# Patient Record
Sex: FEMALE | Race: WHITE | NOT HISPANIC OR LATINO | URBAN - METROPOLITAN AREA
[De-identification: names, ages, dates, MRNs, and addresses within clinical notes are randomized per-mention and may not be internally consistent; named-entity substitution may affect disease eponyms.]

---

## 2019-11-08 ENCOUNTER — HOSPITAL ENCOUNTER (OUTPATIENT)
Dept: OTHER | Facility: HOSPITAL | Age: 49
Discharge: HOME | End: 2019-11-08

## 2019-11-08 LAB
25(OH)D3 SERPL-MCNC: 18.4 NG/ML (ref 30–100)
ALBUMIN SERPL-MCNC: 4.3 G/DL (ref 3.5–5)
ALBUMIN/GLOB SERPL: 1.4 {RATIO} (ref 1.4–2.6)
ALP SERPL-CCNC: 51 U/L (ref 42–98)
ALT SERPL-CCNC: 9 U/L (ref 10–40)
ANION GAP SERPL CALC-SCNC: 16 MMOL/L (ref 8–19)
APPEARANCE UR: CLEAR
AST SERPL-CCNC: 13 U/L (ref 15–50)
BILIRUB SERPL-MCNC: 0.2 MG/DL (ref 0.2–1.3)
BILIRUB UR QL: NEGATIVE
BUN SERPL-MCNC: 9 MG/DL (ref 5–25)
BUN/CREAT SERPL: 10 {RATIO} (ref 6–20)
CALCIUM SERPL-MCNC: 9.6 MG/DL (ref 8.7–10.4)
CHLORIDE SERPL-SCNC: 103 MMOL/L (ref 99–111)
CHOLEST SERPL-MCNC: 214 MG/DL (ref 107–200)
CHOLEST/HDLC SERPL: 4.1 {RATIO} (ref 3–6)
COLOR UR: YELLOW
CONV CO2: 23 MMOL/L (ref 22–32)
CONV COLLECTION SOURCE (UA): NORMAL
CONV TOTAL PROTEIN: 7.3 G/DL (ref 6.3–8.2)
CONV UROBILINOGEN IN URINE BY AUTOMATED TEST STRIP: 0.2 {EHRLICHU}/DL (ref 0.1–1)
CREAT UR-MCNC: 0.88 MG/DL (ref 0.5–0.9)
GFR SERPLBLD BASED ON 1.73 SQ M-ARVRAT: >60 ML/MIN/{1.73_M2}
GLOBULIN UR ELPH-MCNC: 3 G/DL (ref 2–3.5)
GLUCOSE SERPL-MCNC: 125 MG/DL (ref 65–99)
GLUCOSE UR QL: NEGATIVE MG/DL
HDLC SERPL-MCNC: 52 MG/DL (ref 40–60)
HGB UR QL STRIP: NEGATIVE
KETONES UR QL STRIP: NEGATIVE MG/DL
LDLC SERPL CALC-MCNC: 134 MG/DL (ref 70–100)
LEUKOCYTE ESTERASE UR QL STRIP: NEGATIVE
NITRITE UR QL STRIP: NEGATIVE
OSMOLALITY SERPL CALC.SUM OF ELEC: 286 MOSM/KG (ref 273–304)
PH UR STRIP.AUTO: 6.5 [PH] (ref 5–8)
POTASSIUM SERPL-SCNC: 3.6 MMOL/L (ref 3.5–5.3)
PROT UR QL: NEGATIVE MG/DL
SODIUM SERPL-SCNC: 138 MMOL/L (ref 135–147)
SP GR UR: 1.01 (ref 1–1.03)
TRIGL SERPL-MCNC: 140 MG/DL (ref 40–150)
VLDLC SERPL-MCNC: 28 MG/DL (ref 5–37)

## 2019-11-10 LAB
HIV1 RNA # SERPL NAA+PROBE: <20 COPIES/ML
HIV1 RNA # SERPL NAA+PROBE: NORMAL LOG10COPY/ML

## 2019-11-18 ENCOUNTER — HOSPITAL ENCOUNTER (OUTPATIENT)
Dept: OTHER | Facility: HOSPITAL | Age: 49
Discharge: HOME | End: 2019-11-18

## 2019-11-19 LAB — Lab: NORMAL

## 2020-06-01 ENCOUNTER — HOSPITAL ENCOUNTER (OUTPATIENT)
Dept: URGENT CARE | Facility: CLINIC | Age: 50
Discharge: HOME | End: 2020-06-01
Attending: FAMILY MEDICINE

## 2020-06-12 ENCOUNTER — HOSPITAL ENCOUNTER (OUTPATIENT)
Dept: OTHER | Facility: HOSPITAL | Age: 50
Discharge: HOME | End: 2020-06-12

## 2020-06-12 LAB
25(OH)D3 SERPL-MCNC: 15.4 NG/ML (ref 30–100)
ALBUMIN SERPL-MCNC: 4.4 G/DL (ref 3.5–5)
ALBUMIN/GLOB SERPL: 1.5 {RATIO} (ref 1.4–2.6)
ALP SERPL-CCNC: 46 U/L (ref 42–98)
ALT SERPL-CCNC: 17 U/L (ref 10–40)
ANION GAP SERPL CALC-SCNC: 13 MMOL/L (ref 8–19)
APPEARANCE UR: CLEAR
AST SERPL-CCNC: 17 U/L (ref 15–50)
BILIRUB SERPL-MCNC: <0.15 MG/DL (ref 0.2–1.3)
BILIRUB UR QL: NEGATIVE
BUN SERPL-MCNC: 9 MG/DL (ref 5–25)
BUN/CREAT SERPL: 9 {RATIO} (ref 6–20)
CALCIUM SERPL-MCNC: 9.7 MG/DL (ref 8.7–10.4)
CHLORIDE SERPL-SCNC: 105 MMOL/L (ref 99–111)
CHOLEST SERPL-MCNC: 233 MG/DL (ref 107–200)
CHOLEST/HDLC SERPL: 3.8 {RATIO} (ref 3–6)
COLOR UR: YELLOW
CONV BACTERIA: ABNORMAL
CONV CO2: 24 MMOL/L (ref 22–32)
CONV COLLECTION SOURCE (UA): ABNORMAL
CONV TOTAL PROTEIN: 7.4 G/DL (ref 6.3–8.2)
CONV UROBILINOGEN IN URINE BY AUTOMATED TEST STRIP: 0.2 {EHRLICHU}/DL (ref 0.1–1)
CREAT UR-MCNC: 1.03 MG/DL (ref 0.5–0.9)
GFR SERPLBLD BASED ON 1.73 SQ M-ARVRAT: >60 ML/MIN/{1.73_M2}
GLOBULIN UR ELPH-MCNC: 3 G/DL (ref 2–3.5)
GLUCOSE SERPL-MCNC: 86 MG/DL (ref 65–99)
GLUCOSE UR QL: NEGATIVE MG/DL
HDLC SERPL-MCNC: 61 MG/DL (ref 40–60)
HGB UR QL STRIP: NEGATIVE
KETONES UR QL STRIP: NEGATIVE MG/DL
LDLC SERPL CALC-MCNC: 142 MG/DL (ref 70–100)
LEUKOCYTE ESTERASE UR QL STRIP: ABNORMAL
NITRITE UR QL STRIP: NEGATIVE
OSMOLALITY SERPL CALC.SUM OF ELEC: 284 MOSM/KG (ref 273–304)
PH UR STRIP.AUTO: 6 [PH] (ref 5–8)
POTASSIUM SERPL-SCNC: 4 MMOL/L (ref 3.5–5.3)
PROT UR QL: NEGATIVE MG/DL
RBC #/AREA URNS HPF: ABNORMAL /[HPF]
RPR SER QL: NORMAL
SODIUM SERPL-SCNC: 138 MMOL/L (ref 135–147)
SP GR UR: 1.01 (ref 1–1.03)
TRIGL SERPL-MCNC: 151 MG/DL (ref 40–150)
VLDLC SERPL-MCNC: 30 MG/DL (ref 5–37)
WBC #/AREA URNS HPF: ABNORMAL /[HPF]

## 2020-06-13 LAB — Lab: NORMAL

## 2020-06-14 LAB
HIV1 RNA # SERPL NAA+PROBE: <20 COPIES/ML
HIV1 RNA # SERPL NAA+PROBE: NORMAL LOG10COPY/ML

## 2020-06-17 LAB
HIV1 RNA # SERPL NAA+PROBE: 120 COPIES/ML
HIV1 RNA # SERPL NAA+PROBE: 2.08 LOG10COPY/ML
Lab: NORMAL

## 2020-07-14 ENCOUNTER — HOSPITAL ENCOUNTER (OUTPATIENT)
Dept: URGENT CARE | Facility: CLINIC | Age: 50
Discharge: HOME | End: 2020-07-14
Attending: NURSE PRACTITIONER

## 2020-07-18 ENCOUNTER — HOSPITAL ENCOUNTER (OUTPATIENT)
Dept: URGENT CARE | Facility: CLINIC | Age: 50
Discharge: HOME | End: 2020-07-18

## 2021-03-21 ENCOUNTER — HOSPITAL ENCOUNTER (OUTPATIENT)
Dept: URGENT CARE | Facility: CLINIC | Age: 51
Discharge: HOME | End: 2021-03-21
Attending: NURSE PRACTITIONER

## 2021-04-02 ENCOUNTER — HOSPITAL ENCOUNTER (OUTPATIENT)
Dept: OTHER | Facility: HOSPITAL | Age: 51
Discharge: HOME | End: 2021-04-02

## 2021-04-02 LAB
ALBUMIN SERPL-MCNC: 4.1 G/DL (ref 3.5–5)
ALBUMIN/GLOB SERPL: 1.3 {RATIO} (ref 1.4–2.6)
ALP SERPL-CCNC: 41 U/L (ref 42–98)
ALT SERPL-CCNC: 10 U/L (ref 10–40)
ANION GAP SERPL CALC-SCNC: 15 MMOL/L (ref 8–19)
AST SERPL-CCNC: 14 U/L (ref 15–50)
BASOPHILS # BLD AUTO: 0.02 10*3/UL (ref 0–0.2)
BASOPHILS NFR BLD AUTO: 0.2 % (ref 0–3)
BILIRUB SERPL-MCNC: <0.15 MG/DL (ref 0.2–1.3)
BUN SERPL-MCNC: 16 MG/DL (ref 5–25)
BUN/CREAT SERPL: 14 {RATIO} (ref 6–20)
CALCIUM SERPL-MCNC: 10.1 MG/DL (ref 8.7–10.4)
CHLORIDE SERPL-SCNC: 105 MMOL/L (ref 99–111)
CK SERPL-CCNC: 48 U/L (ref 35–230)
CONV ABS IMM GRAN: 0.04 10*3/UL (ref 0–0.2)
CONV CO2: 23 MMOL/L (ref 22–32)
CONV IMMATURE GRAN: 0.4 % (ref 0–1.8)
CONV TOTAL PROTEIN: 7.3 G/DL (ref 6.3–8.2)
CREAT UR-MCNC: 1.13 MG/DL (ref 0.5–0.9)
DEPRECATED RDW RBC AUTO: 51 FL (ref 36.4–46.3)
EOSINOPHIL # BLD AUTO: 0.08 10*3/UL (ref 0–0.7)
EOSINOPHIL # BLD AUTO: 0.9 % (ref 0–7)
ERYTHROCYTE [DISTWIDTH] IN BLOOD BY AUTOMATED COUNT: 13.9 % (ref 11.7–14.4)
GFR SERPLBLD BASED ON 1.73 SQ M-ARVRAT: 57 ML/MIN/{1.73_M2}
GLOBULIN UR ELPH-MCNC: 3.2 G/DL (ref 2–3.5)
GLUCOSE SERPL-MCNC: 92 MG/DL (ref 65–99)
HCT VFR BLD AUTO: 41.9 % (ref 37–47)
HGB BLD-MCNC: 13.7 G/DL (ref 12–16)
LYMPHOCYTES # BLD AUTO: 1.9 10*3/UL (ref 1–5)
LYMPHOCYTES NFR BLD AUTO: 20.3 % (ref 20–45)
MCH RBC QN AUTO: 32.5 PG (ref 27–31)
MCHC RBC AUTO-ENTMCNC: 32.7 G/DL (ref 33–37)
MCV RBC AUTO: 99.5 FL (ref 81–99)
MONOCYTES # BLD AUTO: 0.59 10*3/UL (ref 0.2–1.2)
MONOCYTES NFR BLD AUTO: 6.3 % (ref 3–10)
NEUTROPHILS # BLD AUTO: 6.73 10*3/UL (ref 2–8)
NEUTROPHILS NFR BLD AUTO: 71.9 % (ref 30–85)
NRBC CBCN: 0 % (ref 0–0.7)
OSMOLALITY SERPL CALC.SUM OF ELEC: 287 MOSM/KG (ref 273–304)
PLATELET # BLD AUTO: 221 10*3/UL (ref 130–400)
PMV BLD AUTO: 10.8 FL (ref 9.4–12.3)
POTASSIUM SERPL-SCNC: 4.9 MMOL/L (ref 3.5–5.3)
RBC # BLD AUTO: 4.21 10*6/UL (ref 4.2–5.4)
SODIUM SERPL-SCNC: 138 MMOL/L (ref 135–147)
WBC # BLD AUTO: 9.36 10*3/UL (ref 4.8–10.8)

## 2021-04-03 LAB
HIV1 RNA # SERPL NAA+PROBE: 1.48 LOG10COPY/ML
HIV1 RNA # SERPL NAA+PROBE: 30 COPIES/ML

## 2021-04-04 LAB
CD3 CELLS # BLD: 1675 CELLS/UL (ref 570–2400)
CD3+CD4+ CELLS/CD3+CD8+ CLL BLD: 0.23 RATIO (ref 0.8–3.9)
CONV LYMPHOCYTE SUBSET PANEL 3 INFO.: NORMAL
CONV TREPONEMA PALLIDUM (RPR) WITH FTA-ABS, TP-PA REFLEXES: NON REACTIVE
DEPRECATED CD4 CELLS # BLD: 294 CELLS/UL (ref 430–1800)
DEPRECATED CD8 CELLS # BLD: 1290 CELLS/UL (ref 210–1200)

## 2021-07-06 PROCEDURE — 87086 URINE CULTURE/COLONY COUNT: CPT | Performed by: NURSE PRACTITIONER

## 2021-07-07 ENCOUNTER — TELEPHONE (OUTPATIENT)
Dept: URGENT CARE | Facility: CLINIC | Age: 51
End: 2021-07-07

## 2021-07-07 NOTE — TELEPHONE ENCOUNTER
----- Message from EVI Campos sent at 7/7/2021 10:35 AM EDT -----  Please notify patient of negative urine culture, recommend follow up with PCP, to ED if worsens or as needed.

## 2021-12-01 ENCOUNTER — APPOINTMENT (OUTPATIENT)
Dept: CT IMAGING | Facility: HOSPITAL | Age: 51
End: 2021-12-01

## 2021-12-01 ENCOUNTER — HOSPITAL ENCOUNTER (EMERGENCY)
Facility: HOSPITAL | Age: 51
Discharge: HOME | End: 2021-12-01
Attending: EMERGENCY MEDICINE | Admitting: EMERGENCY MEDICINE

## 2021-12-01 VITALS
SYSTOLIC BLOOD PRESSURE: 130 MMHG | OXYGEN SATURATION: 100 % | HEIGHT: 62 IN | HEART RATE: 92 BPM | BODY MASS INDEX: 25.15 KG/M2 | RESPIRATION RATE: 16 BRPM | WEIGHT: 136.69 LBS | DIASTOLIC BLOOD PRESSURE: 72 MMHG | TEMPERATURE: 98.7 F

## 2021-12-01 DIAGNOSIS — R10.9 ABDOMINAL PAIN, UNSPECIFIED ABDOMINAL LOCATION: ICD-10-CM

## 2021-12-01 DIAGNOSIS — R19.7 DIARRHEA, UNSPECIFIED TYPE: Primary | ICD-10-CM

## 2021-12-01 LAB
027 TOXIN: NORMAL
ALBUMIN SERPL-MCNC: 5 G/DL (ref 3.5–5.2)
ALBUMIN/GLOB SERPL: 1.4 G/DL
ALP SERPL-CCNC: 90 U/L (ref 39–117)
ALT SERPL W P-5'-P-CCNC: 22 U/L (ref 1–33)
ANION GAP SERPL CALCULATED.3IONS-SCNC: 17.4 MMOL/L (ref 5–15)
AST SERPL-CCNC: 33 U/L (ref 1–32)
BACTERIA UR QL AUTO: ABNORMAL /HPF
BASOPHILS # BLD AUTO: 0.02 10*3/MM3 (ref 0–0.2)
BASOPHILS NFR BLD AUTO: 0.3 % (ref 0–1.5)
BILIRUB SERPL-MCNC: 0.4 MG/DL (ref 0–1.2)
BILIRUB UR QL STRIP: NEGATIVE
BUN SERPL-MCNC: 8 MG/DL (ref 6–20)
BUN/CREAT SERPL: 7.9 (ref 7–25)
C COLI+JEJ+UPSA DNA STL QL NAA+NON-PROBE: NOT DETECTED
C DIFF TOX GENS STL QL NAA+PROBE: NEGATIVE
CALCIUM SPEC-SCNC: 9.9 MG/DL (ref 8.6–10.5)
CHLORIDE SERPL-SCNC: 94 MMOL/L (ref 98–107)
CLARITY UR: ABNORMAL
CO2 SERPL-SCNC: 24.6 MMOL/L (ref 22–29)
COLOR UR: YELLOW
CREAT SERPL-MCNC: 1.01 MG/DL (ref 0.57–1)
D-LACTATE SERPL-SCNC: 2.9 MMOL/L (ref 0.5–2)
DEPRECATED RDW RBC AUTO: 46.5 FL (ref 37–54)
EC STX1+STX2 GENES STL QL NAA+NON-PROBE: NOT DETECTED
EOSINOPHIL # BLD AUTO: 0.04 10*3/MM3 (ref 0–0.4)
EOSINOPHIL NFR BLD AUTO: 0.6 % (ref 0.3–6.2)
ERYTHROCYTE [DISTWIDTH] IN BLOOD BY AUTOMATED COUNT: 13.2 % (ref 12.3–15.4)
FLUAV AG NPH QL: NEGATIVE
FLUBV AG NPH QL IA: NEGATIVE
GFR SERPL CREATININE-BSD FRML MDRD: 58 ML/MIN/1.73
GLOBULIN UR ELPH-MCNC: 3.6 GM/DL
GLUCOSE SERPL-MCNC: 122 MG/DL (ref 65–99)
GLUCOSE UR STRIP-MCNC: NEGATIVE MG/DL
HCT VFR BLD AUTO: 47.6 % (ref 34–46.6)
HGB BLD-MCNC: 16.8 G/DL (ref 12–15.9)
HGB UR QL STRIP.AUTO: ABNORMAL
HOLD SPECIMEN: NORMAL
HOLD SPECIMEN: NORMAL
HYALINE CASTS UR QL AUTO: ABNORMAL /LPF
IMM GRANULOCYTES # BLD AUTO: 0.02 10*3/MM3 (ref 0–0.05)
IMM GRANULOCYTES NFR BLD AUTO: 0.3 % (ref 0–0.5)
KETONES UR QL STRIP: NEGATIVE
LEUKOCYTE ESTERASE UR QL STRIP.AUTO: NEGATIVE
LIPASE SERPL-CCNC: 33 U/L (ref 13–60)
LYMPHOCYTES # BLD AUTO: 1.49 10*3/MM3 (ref 0.7–3.1)
LYMPHOCYTES NFR BLD AUTO: 21.9 % (ref 19.6–45.3)
MAGNESIUM SERPL-MCNC: 1.6 MG/DL (ref 1.6–2.6)
MCH RBC QN AUTO: 33.5 PG (ref 26.6–33)
MCHC RBC AUTO-ENTMCNC: 35.3 G/DL (ref 31.5–35.7)
MCV RBC AUTO: 95 FL (ref 79–97)
MONOCYTES # BLD AUTO: 0.41 10*3/MM3 (ref 0.1–0.9)
MONOCYTES NFR BLD AUTO: 6 % (ref 5–12)
NEUTROPHILS NFR BLD AUTO: 4.83 10*3/MM3 (ref 1.7–7)
NEUTROPHILS NFR BLD AUTO: 70.9 % (ref 42.7–76)
NITRITE UR QL STRIP: NEGATIVE
NRBC BLD AUTO-RTO: 0 /100 WBC (ref 0–0.2)
PH UR STRIP.AUTO: 5.5 [PH] (ref 5–8)
PLATELET # BLD AUTO: 213 10*3/MM3 (ref 140–450)
PMV BLD AUTO: 9.5 FL (ref 6–12)
POTASSIUM SERPL-SCNC: 3.8 MMOL/L (ref 3.5–5.2)
PROT SERPL-MCNC: 8.6 G/DL (ref 6–8.5)
PROT UR QL STRIP: ABNORMAL
RBC # BLD AUTO: 5.01 10*6/MM3 (ref 3.77–5.28)
RBC # UR STRIP: ABNORMAL /HPF
REF LAB TEST METHOD: ABNORMAL
S ENT+BONG DNA STL QL NAA+NON-PROBE: NOT DETECTED
SARS-COV-2 N GENE RESP QL NAA+PROBE: NOT DETECTED
SHIGELLA SP+EIEC IPAH ST NAA+NON-PROBE: NOT DETECTED
SODIUM SERPL-SCNC: 136 MMOL/L (ref 136–145)
SP GR UR STRIP: 1.01 (ref 1–1.03)
SQUAMOUS #/AREA URNS HPF: ABNORMAL /HPF
UROBILINOGEN UR QL STRIP: ABNORMAL
WBC # UR STRIP: ABNORMAL /HPF
WBC NRBC COR # BLD: 6.81 10*3/MM3 (ref 3.4–10.8)
WHOLE BLOOD HOLD SPECIMEN: NORMAL
WHOLE BLOOD HOLD SPECIMEN: NORMAL

## 2021-12-01 PROCEDURE — 25010000002 DICYCLOMINE PER 20 MG: Performed by: EMERGENCY MEDICINE

## 2021-12-01 PROCEDURE — 81001 URINALYSIS AUTO W/SCOPE: CPT | Performed by: EMERGENCY MEDICINE

## 2021-12-01 PROCEDURE — 96372 THER/PROPH/DIAG INJ SC/IM: CPT

## 2021-12-01 PROCEDURE — 25010000002 ONDANSETRON PER 1 MG: Performed by: EMERGENCY MEDICINE

## 2021-12-01 PROCEDURE — 87635 SARS-COV-2 COVID-19 AMP PRB: CPT | Performed by: EMERGENCY MEDICINE

## 2021-12-01 PROCEDURE — 87493 C DIFF AMPLIFIED PROBE: CPT | Performed by: EMERGENCY MEDICINE

## 2021-12-01 PROCEDURE — 83690 ASSAY OF LIPASE: CPT | Performed by: EMERGENCY MEDICINE

## 2021-12-01 PROCEDURE — 87505 NFCT AGENT DETECTION GI: CPT | Performed by: EMERGENCY MEDICINE

## 2021-12-01 PROCEDURE — 80053 COMPREHEN METABOLIC PANEL: CPT | Performed by: EMERGENCY MEDICINE

## 2021-12-01 PROCEDURE — 96374 THER/PROPH/DIAG INJ IV PUSH: CPT

## 2021-12-01 PROCEDURE — 74177 CT ABD & PELVIS W/CONTRAST: CPT

## 2021-12-01 PROCEDURE — 85025 COMPLETE CBC W/AUTO DIFF WBC: CPT | Performed by: EMERGENCY MEDICINE

## 2021-12-01 PROCEDURE — 99283 EMERGENCY DEPT VISIT LOW MDM: CPT

## 2021-12-01 PROCEDURE — 87804 INFLUENZA ASSAY W/OPTIC: CPT | Performed by: EMERGENCY MEDICINE

## 2021-12-01 PROCEDURE — 0 IOPAMIDOL PER 1 ML: Performed by: EMERGENCY MEDICINE

## 2021-12-01 PROCEDURE — 83735 ASSAY OF MAGNESIUM: CPT | Performed by: EMERGENCY MEDICINE

## 2021-12-01 PROCEDURE — 83605 ASSAY OF LACTIC ACID: CPT | Performed by: EMERGENCY MEDICINE

## 2021-12-01 RX ORDER — DICYCLOMINE HYDROCHLORIDE 10 MG/ML
20 INJECTION INTRAMUSCULAR ONCE
Status: COMPLETED | OUTPATIENT
Start: 2021-12-01 | End: 2021-12-01

## 2021-12-01 RX ORDER — ONDANSETRON 2 MG/ML
4 INJECTION INTRAMUSCULAR; INTRAVENOUS ONCE
Status: COMPLETED | OUTPATIENT
Start: 2021-12-01 | End: 2021-12-01

## 2021-12-01 RX ORDER — SODIUM CHLORIDE 0.9 % (FLUSH) 0.9 %
10 SYRINGE (ML) INJECTION AS NEEDED
Status: DISCONTINUED | OUTPATIENT
Start: 2021-12-01 | End: 2021-12-01 | Stop reason: HOSPADM

## 2021-12-01 RX ORDER — ONDANSETRON 8 MG/1
8 TABLET, ORALLY DISINTEGRATING ORAL EVERY 8 HOURS PRN
Qty: 30 TABLET | Refills: 0 | Status: SHIPPED | OUTPATIENT
Start: 2021-12-01 | End: 2021-12-11

## 2021-12-01 RX ORDER — DICYCLOMINE HCL 20 MG
20 TABLET ORAL EVERY 6 HOURS
Qty: 28 TABLET | Refills: 0 | Status: SHIPPED | OUTPATIENT
Start: 2021-12-01 | End: 2021-12-08

## 2021-12-01 RX ORDER — BICTEGRAVIR SODIUM, EMTRICITABINE, AND TENOFOVIR ALAFENAMIDE FUMARATE 50; 200; 25 MG/1; MG/1; MG/1
TABLET ORAL DAILY
COMMUNITY

## 2021-12-01 RX ADMIN — IOPAMIDOL 100 ML: 755 INJECTION, SOLUTION INTRAVENOUS at 05:46

## 2021-12-01 RX ADMIN — SODIUM CHLORIDE 1000 ML: 9 INJECTION, SOLUTION INTRAVENOUS at 04:43

## 2021-12-01 RX ADMIN — DICYCLOMINE HYDROCHLORIDE 20 MG: 20 INJECTION, SOLUTION INTRAMUSCULAR at 04:43

## 2021-12-01 RX ADMIN — ONDANSETRON 4 MG: 2 INJECTION INTRAMUSCULAR; INTRAVENOUS at 04:43

## 2021-12-01 NOTE — ED PROVIDER NOTES
Time: 4:32 AM EST  Arrived by: private car  Chief Complaint: Diarrhea   History provided by: Patient  History is limited by: N/A     History of Present Illness:  Patient is a 51 y.o. year old female that presents to the emergency department with diarrhea.  The patient states that the diarrhea started 3 days ago.  The patient states that she is having watery stool every hour.  The patient believes she is having more than 10-15 stools per day.  She also notes nausea with 3 bouts of emesis.  She denies any coffee-ground emesis or hematemesis.  She also complains of diffuse abdominal pain.  The patient has had no fever, rigors, myalgias or other constitutional symptoms.  The patient denies any headache, sore throat, loss of taste or smell.  The patient has had no chest pain, shortness of breath.  The patient states that the stools are mostly water.  The patient states that she is had several blood-streaked stools.  The patient denies any urinary frequency urgency or dysuria.  The patient denies any known bad food exposure.  The patient denies any other household members with the same symptoms.  The patient has not been on antibiotics in the last 6 weeks.  The patient has had no recent foreign travel.  The patient has had not had symptoms like this previously.      Similar Symptoms Previously: No  Recently seen: Unknown      Patient Care Team  Primary Care Provider: Angi Gross MD    Past Medical History:     Allergies   Allergen Reactions   • Sulfa Antibiotics Rash     Past Medical History:   Diagnosis Date   • HIV (human immunodeficiency virus infection) (HCC)      Past Surgical History:   Procedure Laterality Date   • APPENDECTOMY     • HYSTERECTOMY       Family History   Problem Relation Age of Onset   • Hypertension Mother    • Diabetes Mother    • Heart attack Mother    • Hypertension Father    • Diabetes Father    • Stroke Father    • Heart attack Father    • Seizures Sister        Home Medications:  Prior to  "Admission medications    Not on File        Social History:   Social History     Tobacco Use   • Smoking status: Current Every Day Smoker     Packs/day: 1.00     Years: 30.00     Pack years: 30.00     Types: Cigarettes   • Smokeless tobacco: Never Used   Vaping Use   • Vaping Use: Never used   Substance Use Topics   • Alcohol use: Never   • Drug use: Never          Record Review:  I have reviewed the patient's records in [facility].     Review of Systems:  Review of Systems   Constitutional: Positive for appetite change and fatigue. Negative for chills, diaphoresis and fever.   HENT: Negative for congestion, postnasal drip, rhinorrhea and sore throat.    Eyes: Negative for photophobia.   Respiratory: Negative for apnea, cough, choking, chest tightness and shortness of breath.    Cardiovascular: Negative for chest pain, palpitations and leg swelling.   Gastrointestinal: Positive for abdominal pain, blood in stool, diarrhea, nausea and vomiting.   Endocrine: Negative.    Genitourinary: Negative for difficulty urinating, dysuria, flank pain, frequency, hematuria and urgency.   Musculoskeletal: Positive for back pain. Negative for gait problem and neck pain.   Skin: Negative for pallor, rash and wound.   Neurological: Positive for light-headedness. Negative for dizziness, speech difficulty, weakness, numbness and headaches.   Hematological: Negative.    Psychiatric/Behavioral: Negative.            Physical Exam:  /85   Pulse 110   Temp 98.7 °F (37.1 °C) (Oral)   Resp 16   Ht 157.5 cm (62\")   Wt 62 kg (136 lb 11 oz)   SpO2 97%   BMI 25.00 kg/m²     Physical Exam  Vitals and nursing note reviewed.   Constitutional:       General: She is not in acute distress.     Appearance: She is not ill-appearing, toxic-appearing or diaphoretic.   HENT:      Head: Normocephalic and atraumatic.      Nose: No congestion or rhinorrhea.      Mouth/Throat:      Mouth: Mucous membranes are dry.   Eyes:      Extraocular Movements: " Extraocular movements intact.   Cardiovascular:      Rate and Rhythm: Regular rhythm. Tachycardia present.      Pulses: Normal pulses.      Heart sounds: Normal heart sounds. No murmur heard.      Pulmonary:      Effort: Pulmonary effort is normal. No accessory muscle usage, respiratory distress or retractions.      Breath sounds: Normal breath sounds. No wheezing, rhonchi or rales.   Abdominal:      General: Abdomen is flat. There is no distension.      Palpations: Abdomen is soft. There is no mass.      Tenderness: There is generalized abdominal tenderness. There is no right CVA tenderness, left CVA tenderness, guarding or rebound. Negative signs include Maria's sign and McBurney's sign.      Hernia: No hernia is present.      Comments: No rigidity   Musculoskeletal:         General: Normal range of motion.      Cervical back: Normal range of motion and neck supple.      Right lower leg: No edema.      Left lower leg: No edema.   Skin:     General: Skin is warm and dry.      Capillary Refill: Capillary refill takes 2 to 3 seconds.      Coloration: Skin is not jaundiced or pale.      Findings: No erythema.   Neurological:      General: No focal deficit present.      Mental Status: She is alert. Mental status is at baseline.      Sensory: No sensory deficit.      Motor: No weakness.   Psychiatric:         Mood and Affect: Mood normal.         Behavior: Behavior normal.                Medications in the Emergency Department:  Medications   sodium chloride 0.9 % flush 10 mL (has no administration in time range)   sodium chloride 0.9 % flush 10 mL (has no administration in time range)   ondansetron (ZOFRAN) injection 4 mg (4 mg Intravenous Given 12/1/21 0443)   dicyclomine (BENTYL) injection 20 mg (20 mg Intramuscular Given 12/1/21 0443)   sodium chloride 0.9 % bolus 1,000 mL (0 mL Intravenous Stopped 12/1/21 0524)   sodium chloride 0.9 % bolus 1,000 mL (0 mL Intravenous Stopped 12/1/21 0525)   iopamidol (ISOVUE-370)  76 % injection 100 mL (100 mL Intravenous Given 12/1/21 0546)        Labs  Lab Results (last 24 hours)     Procedure Component Value Units Date/Time    CBC & Differential [[id number]]  (Abnormal) Collected: 12/01/21 0444    Specimen: Blood Updated: 12/01/21 0500    Narrative:      The following orders were created for panel order CBC & Differential.  Procedure                               Abnormality         Status                     ---------                               -----------         ------                     CBC Auto Differential[[id number]]        Abnormal            Final result                 Please view results for these tests on the individual orders.    Comprehensive Metabolic Panel [[id number]]  (Abnormal) Collected: 12/01/21 0444    Specimen: Blood Updated: 12/01/21 0528     Glucose 122 mg/dL      BUN 8 mg/dL      Creatinine 1.01 mg/dL      Sodium 136 mmol/L      Potassium 3.8 mmol/L      Chloride 94 mmol/L      CO2 24.6 mmol/L      Calcium 9.9 mg/dL      Total Protein 8.6 g/dL      Albumin 5.00 g/dL      ALT (SGPT) 22 U/L      AST (SGOT) 33 U/L      Alkaline Phosphatase 90 U/L      Total Bilirubin 0.4 mg/dL      eGFR Non African Amer 58 mL/min/1.73      Globulin 3.6 gm/dL      A/G Ratio 1.4 g/dL      BUN/Creatinine Ratio 7.9     Anion Gap 17.4 mmol/L     Narrative:      GFR Normal >60  Chronic Kidney Disease <60  Kidney Failure <15      Lipase [[id number]]  (Normal) Collected: 12/01/21 0444    Specimen: Blood Updated: 12/01/21 0528     Lipase 33 U/L     Urinalysis With Microscopic If Indicated (No Culture) - Urine, Clean Catch [[id number]]  (Abnormal) Collected: 12/01/21 0444    Specimen: Urine, Clean Catch Updated: 12/01/21 0505     Color, UA Yellow     Appearance, UA Cloudy     pH, UA 5.5     Specific Gravity, UA 1.015     Glucose, UA Negative     Ketones, UA Negative     Bilirubin, UA Negative     Blood, UA Trace     Protein, UA 30 mg/dL (1+)     Leuk Esterase, UA Negative     Nitrite, UA  Negative     Urobilinogen, UA 0.2 E.U./dL    CBC Auto Differential [[id number]]  (Abnormal) Collected: 12/01/21 0444    Specimen: Blood Updated: 12/01/21 0500     WBC 6.81 10*3/mm3      RBC 5.01 10*6/mm3      Hemoglobin 16.8 g/dL      Hematocrit 47.6 %      MCV 95.0 fL      MCH 33.5 pg      MCHC 35.3 g/dL      RDW 13.2 %      RDW-SD 46.5 fl      MPV 9.5 fL      Platelets 213 10*3/mm3      Neutrophil % 70.9 %      Lymphocyte % 21.9 %      Monocyte % 6.0 %      Eosinophil % 0.6 %      Basophil % 0.3 %      Immature Grans % 0.3 %      Neutrophils, Absolute 4.83 10*3/mm3      Lymphocytes, Absolute 1.49 10*3/mm3      Monocytes, Absolute 0.41 10*3/mm3      Eosinophils, Absolute 0.04 10*3/mm3      Basophils, Absolute 0.02 10*3/mm3      Immature Grans, Absolute 0.02 10*3/mm3      nRBC 0.0 /100 WBC     Urinalysis, Microscopic Only - Urine, Clean Catch [[id number]]  (Abnormal) Collected: 12/01/21 0444    Specimen: Urine, Clean Catch Updated: 12/01/21 0505     RBC, UA 3-5 /HPF      WBC, UA 3-5 /HPF      Bacteria, UA 2+ /HPF      Squamous Epithelial Cells, UA 13-20 /HPF      Hyaline Casts, UA 3-6 /LPF      Methodology Automated Microscopy    Magnesium [[id number]]  (Normal) Collected: 12/01/21 0448    Specimen: Blood Updated: 12/01/21 0523     Magnesium 1.6 mg/dL     Lactic Acid, Plasma [[id number]]  (Abnormal) Collected: 12/01/21 0448    Specimen: Blood Updated: 12/01/21 0544     Lactate 2.9 mmol/L     Influenza Antigen, Rapid - Swab, Nasopharynx [[id number]]  (Normal) Collected: 12/01/21 0457    Specimen: Swab from Nasopharynx Updated: 12/01/21 0536     Influenza A Ag, EIA Negative     Influenza B Ag, EIA Negative    COVID-19,CEPHEID/MARILEE/BDMAX,COR/BRENDON/PAD/RADHA IN-HOUSE(OR EMERGENT/ADD-ON),NP SWAB IN TRANSPORT MEDIA 3-4 HR TAT, RT-PCR - Swab, Nasopharynx [[id number]] Collected: 12/01/21 0458    Specimen: Swab from Nasopharynx Updated: 12/01/21 0512    Clostridium Difficile Toxin, PCR - Stool, Per Rectum [[id number]] Collected:  12/01/21 0537    Specimen: Stool from Per Rectum Updated: 12/01/21 0541    Enteric Bacterial Panel - Stool, Per Rectum [[id number]] Collected: 12/01/21 0537    Specimen: Stool from Per Rectum Updated: 12/01/21 0615           Imaging:  CT Abdomen Pelvis With Contrast   Final Result          Procedures:  Procedures    Progress                            Medical Decision Making:  MDM  Number of Diagnoses or Management Options  Abdominal pain, unspecified abdominal location  Diarrhea, unspecified type  Diagnosis management comments:       The patient presented today with diarrhea for 3 days.  The patient had no known obvious source of diarrhea.  The patient was also complaining of diffuse abdominal cramping.  On examination, the patient had mild abdominal tenderness diffusely.  There is no localized pain and the patient had no peritoneal findings.  The patient was afebrile.  The patient's heart rate was mildly tachycardic at 125.  This did improve somewhat with fluids.  The patient's flu was negative.  The patient had a normal magnesium.  The patient had a normal lipase.  The patient's urine was contaminated with squamous epithelial cells.  The patient's urine was nitrite negative and the patient had no urinary tract infection symptoms.  The patient's chemistry demonstrated glucose of 122.  However the patient's BUN was normal at 8 the patient's creatinine was 1.01.  The patient had a normal bicarb of 24.6.  The patient had a normal sodium of 136 and potassium of 3.8 both normal.  The patient's liver enzymes were essentially within normal limits with a slightly elevated AST of 33.  The patient's lactate was slightly elevated at 2.9 this was treated with IV fluids.  The patient's CBC demonstrated normal white blood cell count of 6.81 the patient had an elevated hemoglobin and hematocrit.  The patient's platelets were normal.  The patient gave a stool sample while in the emergency department.  That was sent to the lab for  C. difficile and enteric pathogens.  Those are pending at the time of discharge.  The patient will be following up with her doctor tomorrow for those results.  The patient was also tested for COVID-19 today.  The patient will review those results with her primary care physician in 24 hours as well.  The patient will stay quarantined at home and to they can review those results with her primary care physician.  The patient had a CT scan performed in the abdomen pelvis which demonstrated no acute pathology and no obvious source of the diarrhea.  The patient did have a 3.5 cm right adnexal cyst.  At the time of discharge, the patient appeared well, no distress and nontoxic.  The patient had minimal abdominal tenderness which was diffuse.  The patient states she feels better.  The patient appears appropriate for discharge and outpatient follow-up.  The patient will be following up with her doctor tomorrow for serial reexamination of the abdomen and to review their C. difficile, enteric pathogens and COVID-19 results.  The patient was given very specific instructions on when to return back to the emergency room.  She voiced understanding of those instructions.  The patient was sent home on Bentyl and Zofran.       Amount and/or Complexity of Data Reviewed  Clinical lab tests: reviewed  Tests in the radiology section of CPT®: reviewed               Final diagnoses:   Diarrhea, unspecified type   Abdominal pain, unspecified abdominal location        Disposition:  ED Disposition     ED Disposition Condition Comment    Discharge Stable           This medical record created using voice recognition software and may contain unintended errors.    DO Angelica Quinn Scott, DO  12/02/21 0619

## 2021-12-01 NOTE — DISCHARGE INSTRUCTIONS
Please continue to drink plenty of fluids, Pedialyte and Gatorade  Please follow-up with your doctor tomorrow for serial reexamination of the abdomen and to review your C. difficile and enteric pathogen stool cultures as well as your COVID-19 results  Your were tested for COVID-19 today.  Please stay quarantined at home until  you can review your COVID-19 results with your primary care physician and are released from quarantine    Please return to the emergency immediately for intractable pain, intractable vomiting, worsening diarrhea, near passing out, passing out, uncontrolled fever, rigors, extreme fatigue or any new symptoms you may be concerned about

## 2022-08-22 ENCOUNTER — HOSPITAL ENCOUNTER (EMERGENCY)
Facility: HOSPITAL | Age: 52
Discharge: LEFT BEFORE DISCHARGE | End: 2022-08-22

## 2022-08-22 PROCEDURE — 99211 OFF/OP EST MAY X REQ PHY/QHP: CPT

## 2023-05-02 ENCOUNTER — OFFICE VISIT (OUTPATIENT)
Dept: FAMILY MEDICINE CLINIC | Facility: CLINIC | Age: 53
End: 2023-05-02
Payer: MEDICARE

## 2023-05-02 ENCOUNTER — TELEPHONE (OUTPATIENT)
Dept: FAMILY MEDICINE CLINIC | Facility: CLINIC | Age: 53
End: 2023-05-02

## 2023-05-02 VITALS
WEIGHT: 139.4 LBS | HEART RATE: 72 BPM | DIASTOLIC BLOOD PRESSURE: 63 MMHG | OXYGEN SATURATION: 98 % | HEIGHT: 66 IN | SYSTOLIC BLOOD PRESSURE: 122 MMHG | BODY MASS INDEX: 22.4 KG/M2 | RESPIRATION RATE: 12 BRPM | TEMPERATURE: 98.2 F

## 2023-05-02 DIAGNOSIS — L03.316 NAVEL CELLULITIS: ICD-10-CM

## 2023-05-02 DIAGNOSIS — D89.89 OTHER SPECIFIED DISORDERS INVOLVING THE IMMUNE MECHANISM, NOT ELSEWHERE CLASSIFIED: ICD-10-CM

## 2023-05-02 DIAGNOSIS — R79.9 ABNORMAL FINDING OF BLOOD CHEMISTRY, UNSPECIFIED: ICD-10-CM

## 2023-05-02 DIAGNOSIS — Z21 ASYMPTOMATIC HIV INFECTION, WITH NO HISTORY OF HIV-RELATED ILLNESS: Primary | ICD-10-CM

## 2023-05-02 DIAGNOSIS — Z12.11 COLON CANCER SCREENING: ICD-10-CM

## 2023-05-02 PROBLEM — T14.8XXA FRACTURE: Status: RESOLVED | Noted: 2023-05-02 | Resolved: 2023-05-02

## 2023-05-02 PROBLEM — G43.909 MIGRAINE: Status: RESOLVED | Noted: 2023-05-02 | Resolved: 2023-05-02

## 2023-05-02 PROBLEM — S09.90XA HEAD INJURY: Status: ACTIVE | Noted: 2023-05-02

## 2023-05-02 PROBLEM — D64.9 ANEMIA: Status: ACTIVE | Noted: 2023-05-02

## 2023-05-02 PROBLEM — F32.A DEPRESSION: Status: ACTIVE | Noted: 2023-05-02

## 2023-05-02 PROBLEM — J40 BRONCHITIS: Status: ACTIVE | Noted: 2023-05-02

## 2023-05-02 PROBLEM — J01.80 OTHER ACUTE SINUSITIS: Status: RESOLVED | Noted: 2023-05-02 | Resolved: 2023-05-02

## 2023-05-02 PROBLEM — T14.8XXA BROKEN BONES: Status: RESOLVED | Noted: 2023-05-02 | Resolved: 2023-05-02

## 2023-05-02 PROBLEM — T14.8XXA BROKEN BONES: Status: ACTIVE | Noted: 2023-05-02

## 2023-05-02 PROBLEM — F41.9 ANXIETY: Status: RESOLVED | Noted: 2023-05-02 | Resolved: 2023-05-02

## 2023-05-02 PROBLEM — J40 BRONCHITIS: Status: RESOLVED | Noted: 2023-05-02 | Resolved: 2023-05-02

## 2023-05-02 PROBLEM — J01.80 OTHER ACUTE SINUSITIS: Status: ACTIVE | Noted: 2023-05-02

## 2023-05-02 PROBLEM — T14.8XXA FRACTURE: Status: ACTIVE | Noted: 2023-05-02

## 2023-05-02 PROBLEM — F32.A DEPRESSION: Status: RESOLVED | Noted: 2023-05-02 | Resolved: 2023-05-02

## 2023-05-02 PROBLEM — F41.9 ANXIETY: Status: ACTIVE | Noted: 2023-05-02

## 2023-05-02 PROBLEM — S09.90XA HEAD INJURY: Status: RESOLVED | Noted: 2023-05-02 | Resolved: 2023-05-02

## 2023-05-02 PROBLEM — G43.909 MIGRAINE: Status: ACTIVE | Noted: 2023-05-02

## 2023-05-02 PROBLEM — B20 HIV (HUMAN IMMUNODEFICIENCY VIRUS INFECTION): Status: ACTIVE | Noted: 2023-05-02

## 2023-05-02 PROCEDURE — 99214 OFFICE O/P EST MOD 30 MIN: CPT | Performed by: FAMILY MEDICINE

## 2023-05-02 NOTE — PROGRESS NOTES
"Chief Complaint  Establish Care    Subjective        Kyle Yuen presents to [facility]  History of Present Illness    Presents to establish care has significant history of HIV from blood transfusion she is on chronic antiviral and stable overall follow-up with infectious disease.    She smokes, has complained of some bellybutton pain goes in deep she says no discharge foul smell redness or other.  Her weight is good blood pressure additionally has reported history of anemia but otherwise healthy does want to get labs and screenings I recommended for her age.    Objective   Vital Signs:  /63   Pulse 72   Temp 98.2 °F (36.8 °C)   Resp 12   Ht 167.6 cm (66\")   Wt 63.2 kg (139 lb 6.4 oz)   SpO2 98%   BMI 22.50 kg/m²   Estimated body mass index is 22.5 kg/m² as calculated from the following:    Height as of this encounter: 167.6 cm (66\").    Weight as of this encounter: 63.2 kg (139 lb 6.4 oz).       BMI is within normal parameters. No other follow-up for BMI required.      Physical Exam  Vitals reviewed.   Constitutional:       Appearance: Normal appearance. She is well-developed.   HENT:      Head: Normocephalic and atraumatic.      Right Ear: External ear normal.      Left Ear: External ear normal.      Nose: Nose normal.   Eyes:      Conjunctiva/sclera: Conjunctivae normal.      Pupils: Pupils are equal, round, and reactive to light.   Cardiovascular:      Rate and Rhythm: Normal rate.   Pulmonary:      Effort: Pulmonary effort is normal.      Breath sounds: Normal breath sounds.   Abdominal:      General: There is no distension.      Palpations: Abdomen is soft. There is no hepatomegaly or mass.      Comments: No signs of infection discharge or foreign body on exam of belly button     Skin:     General: Skin is warm and dry.   Neurological:      Mental Status: She is alert and oriented to person, place, and time. Mental status is at baseline.   Psychiatric:         " Mood and Affect: Mood and affect normal.         Behavior: Behavior normal.         Thought Content: Thought content normal.         Judgment: Judgment normal.        Result Review :  The following data was reviewed by: Evan Bueno DO on 05/02/2023:  Common labs        5/5/2023    08:13   Common Labs   Glucose 95     BUN 10     Creatinine 0.91     Sodium 136     Potassium 4.1     Chloride 103     Calcium 9.3     Albumin 4.3     Total Bilirubin 0.2     Alkaline Phosphatase 49     AST (SGOT) 19     ALT (SGPT) 12     WBC 7.21     Hemoglobin 12.8     Hematocrit 38.0     Platelets 193     Total Cholesterol 200     Triglycerides 170     HDL Cholesterol 42     LDL Cholesterol  128                    Assessment and Plan   Diagnoses and all orders for this visit:    1. Asymptomatic HIV infection, with no history of HIV-related illness (Primary)  Assessment & Plan:  Continue antivirals, follow up with infectious disease and can help manage care to prevent traveling long distances as able    Orders:  -     CBC (No Diff); Future  -     Comprehensive metabolic panel; Future  -     Lipid panel; Future  -     TSH+Free T4; Future    2. Abnormal finding of blood chemistry, unspecified  -     Lipid panel; Future    3. Other specified disorders involving the immune mechanism, not elsewhere classified  -     TSH+Free T4; Future    4. Colon cancer screening  -     Cologuard - Stool, Per Rectum; Future    5. Navel cellulitis  Assessment & Plan:  Treatment with cleaning area with soap and water, trying to calm down any irritation cause issues or dislodge is lint or foreign body             Follow Up   Return in about 6 months (around 11/2/2023) for Physical, Labs before, Recheck, AWV.  Patient was given instructions and counseling regarding her condition or for health maintenance advice. Please see specific information pulled into the AVS if appropriate.

## 2023-05-02 NOTE — TELEPHONE ENCOUNTER
Left message for patient that she does need to fast for labs and she can come to the office to get these done.

## 2023-05-02 NOTE — TELEPHONE ENCOUNTER
Caller: Kyle Yuen    Relationship: Self    Best call back number: [phone]     What is the best time to reach you: BEFORE 10AM    Who are you requesting to speak with (clinical staff, provider,  specific staff member): CLINICAL    Do you know the name of the person who called: PATIENT    What was the call regarding: PATIENT IS UNSURE IF SHE NEEDS TO FAST FOR LABS AND WHERE SHE NEEDS TO HAVE THEM DONE    Do you require a callback: YES

## 2023-05-05 ENCOUNTER — LAB (OUTPATIENT)
Dept: LAB | Facility: HOSPITAL | Age: 53
End: 2023-05-05
Payer: MEDICARE

## 2023-05-05 DIAGNOSIS — Z21 ASYMPTOMATIC HIV INFECTION, WITH NO HISTORY OF HIV-RELATED ILLNESS: ICD-10-CM

## 2023-05-05 DIAGNOSIS — R79.9 ABNORMAL FINDING OF BLOOD CHEMISTRY, UNSPECIFIED: ICD-10-CM

## 2023-05-05 DIAGNOSIS — D89.89 OTHER SPECIFIED DISORDERS INVOLVING THE IMMUNE MECHANISM, NOT ELSEWHERE CLASSIFIED: ICD-10-CM

## 2023-05-05 LAB
ALBUMIN SERPL-MCNC: 4.3 G/DL (ref 3.5–5.2)
ALBUMIN/GLOB SERPL: 1.9 G/DL
ALP SERPL-CCNC: 49 U/L (ref 39–117)
ALT SERPL W P-5'-P-CCNC: 12 U/L (ref 1–33)
ANION GAP SERPL CALCULATED.3IONS-SCNC: 8 MMOL/L (ref 5–15)
AST SERPL-CCNC: 19 U/L (ref 1–32)
BILIRUB SERPL-MCNC: 0.2 MG/DL (ref 0–1.2)
BUN SERPL-MCNC: 10 MG/DL (ref 6–20)
BUN/CREAT SERPL: 11 (ref 7–25)
CALCIUM SPEC-SCNC: 9.3 MG/DL (ref 8.6–10.5)
CHLORIDE SERPL-SCNC: 103 MMOL/L (ref 98–107)
CHOLEST SERPL-MCNC: 200 MG/DL (ref 0–200)
CO2 SERPL-SCNC: 25 MMOL/L (ref 22–29)
CREAT SERPL-MCNC: 0.91 MG/DL (ref 0.57–1)
DEPRECATED RDW RBC AUTO: 48.1 FL (ref 37–54)
EGFRCR SERPLBLD CKD-EPI 2021: 76.5 ML/MIN/1.73
ERYTHROCYTE [DISTWIDTH] IN BLOOD BY AUTOMATED COUNT: 14.1 % (ref 12.3–15.4)
GLOBULIN UR ELPH-MCNC: 2.3 GM/DL
GLUCOSE SERPL-MCNC: 95 MG/DL (ref 65–99)
HCT VFR BLD AUTO: 38 % (ref 34–46.6)
HDLC SERPL-MCNC: 42 MG/DL (ref 40–60)
HGB BLD-MCNC: 12.8 G/DL (ref 12–15.9)
LDLC SERPL CALC-MCNC: 128 MG/DL (ref 0–100)
LDLC/HDLC SERPL: 2.95 {RATIO}
MCH RBC QN AUTO: 32.7 PG (ref 26.6–33)
MCHC RBC AUTO-ENTMCNC: 33.7 G/DL (ref 31.5–35.7)
MCV RBC AUTO: 96.9 FL (ref 79–97)
PLATELET # BLD AUTO: 193 10*3/MM3 (ref 140–450)
PMV BLD AUTO: 10.9 FL (ref 6–12)
POTASSIUM SERPL-SCNC: 4.1 MMOL/L (ref 3.5–5.2)
PROT SERPL-MCNC: 6.6 G/DL (ref 6–8.5)
RBC # BLD AUTO: 3.92 10*6/MM3 (ref 3.77–5.28)
SODIUM SERPL-SCNC: 136 MMOL/L (ref 136–145)
T4 FREE SERPL-MCNC: 1.02 NG/DL (ref 0.93–1.7)
TRIGL SERPL-MCNC: 170 MG/DL (ref 0–150)
TSH SERPL DL<=0.05 MIU/L-ACNC: 4.9 UIU/ML (ref 0.27–4.2)
VLDLC SERPL-MCNC: 30 MG/DL (ref 5–40)
WBC NRBC COR # BLD: 7.21 10*3/MM3 (ref 3.4–10.8)

## 2023-05-05 PROCEDURE — 80053 COMPREHEN METABOLIC PANEL: CPT

## 2023-05-05 PROCEDURE — 84443 ASSAY THYROID STIM HORMONE: CPT

## 2023-05-05 PROCEDURE — 84439 ASSAY OF FREE THYROXINE: CPT

## 2023-05-05 PROCEDURE — 36415 COLL VENOUS BLD VENIPUNCTURE: CPT

## 2023-05-05 PROCEDURE — 80061 LIPID PANEL: CPT

## 2023-05-05 PROCEDURE — 85027 COMPLETE CBC AUTOMATED: CPT

## 2023-05-07 RX ORDER — CEPHALEXIN 500 MG/1
500 CAPSULE ORAL 3 TIMES DAILY
Qty: 21 CAPSULE | Refills: 0 | Status: SHIPPED | OUTPATIENT
Start: 2023-05-07

## 2023-05-10 ENCOUNTER — OFFICE VISIT (OUTPATIENT)
Dept: FAMILY MEDICINE CLINIC | Facility: CLINIC | Age: 53
End: 2023-05-10

## 2023-05-10 VITALS
BODY MASS INDEX: 21.62 KG/M2 | HEIGHT: 66 IN | RESPIRATION RATE: 12 BRPM | HEART RATE: 92 BPM | WEIGHT: 134.5 LBS | DIASTOLIC BLOOD PRESSURE: 78 MMHG | TEMPERATURE: 98.9 F | SYSTOLIC BLOOD PRESSURE: 150 MMHG | OXYGEN SATURATION: 100 %

## 2023-05-10 DIAGNOSIS — Z00.00 ENCOUNTER FOR SUBSEQUENT ANNUAL WELLNESS VISIT (AWV) IN MEDICARE PATIENT: Primary | ICD-10-CM

## 2023-05-10 DIAGNOSIS — Z21 ASYMPTOMATIC HIV INFECTION, WITH NO HISTORY OF HIV-RELATED ILLNESS: ICD-10-CM

## 2023-05-10 DIAGNOSIS — N30.00 ACUTE CYSTITIS WITHOUT HEMATURIA: ICD-10-CM

## 2023-05-10 PROCEDURE — G0439 PPPS, SUBSEQ VISIT: HCPCS | Performed by: FAMILY MEDICINE

## 2023-05-10 RX ORDER — LEVOFLOXACIN 750 MG/1
750 TABLET ORAL DAILY
Qty: 7 TABLET | Refills: 0 | Status: SHIPPED | OUTPATIENT
Start: 2023-05-10 | End: 2023-05-24

## 2023-05-10 NOTE — PROGRESS NOTES
The ABCs of the Annual Wellness Visit  Subsequent Medicare Wellness Visit    Chief Complaint   Patient presents with   • Medicare Wellness-subsequent   • Urinary Tract Infection      Subjective    History of Present Illness:  Kyle Yuen is a 53 y.o. female who presents for a Subsequent Medicare Wellness Visit.    The following portions of the patient's history were reviewed and   updated as appropriate: allergies, current medications, past family history, past medical history, past social history, past surgical history and problem list.    Compared to one year ago, the patient feels her physical   health is the same.    Compared to one year ago, the patient feels her mental   health is the same.    Recent Hospitalizations:  She was not admitted to the hospital during the last year.       Current Medical Providers:  Patient Care Team:  Evan Bueno DO as PCP - General (Family Medicine)  Julian Wilson DO as Consulting Physician (Infectious Diseases)    Outpatient Medications Prior to Visit   Medication Sig Dispense Refill   • Bictegravir-Emtricitab-Tenofov (Biktarvy) -25 MG per tablet Take  by mouth Daily.     • cephalexin (Keflex) 500 MG capsule Take 1 capsule by mouth 3 (Three) Times a Day. 21 capsule 0     No facility-administered medications prior to visit.       No opioid medication identified on active medication list. I have reviewed chart for other potential  high risk medication/s and harmful drug interactions in the elderly.          Aspirin is not on active medication list.  Aspirin use is not indicated based on review of current medical condition/s. Risk of harm outweighs potential benefits.  .    Patient Active Problem List   Diagnosis   • Anemia   • Cigarette nicotine dependence   • HIV (human immunodeficiency virus infection)   • Encounter for subsequent annual wellness visit (AWV) in Medicare patient   • Navel cellulitis     Advance Care Planning  Advance Directive is not on file.  ACP  "discussion was declined by the patient. Patient has an advance directive (not in EMR), copy requested.          Objective    Vitals:    05/10/23 1517 05/10/23 1520   BP: 150/89 150/78   Pulse: 92    Resp: 12    Temp: 98.9 °F (37.2 °C)    SpO2: 100%    Weight: 61 kg (134 lb 8 oz)    Height: 167.6 cm (66\")    PainSc:   7      Estimated body mass index is 21.71 kg/m² as calculated from the following:    Height as of this encounter: 167.6 cm (66\").    Weight as of this encounter: 61 kg (134 lb 8 oz).    BMI is within normal parameters. No other follow-up for BMI required.      Does the patient have evidence of cognitive impairment? No    Physical Exam  Vitals reviewed.   Constitutional:       Appearance: Normal appearance. She is well-developed.   HENT:      Head: Normocephalic and atraumatic.      Right Ear: External ear normal.      Left Ear: External ear normal.      Nose: Nose normal.   Eyes:      Conjunctiva/sclera: Conjunctivae normal.      Pupils: Pupils are equal, round, and reactive to light.   Cardiovascular:      Rate and Rhythm: Normal rate.   Pulmonary:      Effort: Pulmonary effort is normal.      Breath sounds: Normal breath sounds.   Abdominal:      General: There is no distension.   Skin:     General: Skin is warm and dry.   Neurological:      Mental Status: She is alert and oriented to person, place, and time. Mental status is at baseline.   Psychiatric:         Mood and Affect: Mood and affect normal.         Behavior: Behavior normal.         Thought Content: Thought content normal.         Judgment: Judgment normal.       Lab Results   Component Value Date    TRIG 170 (H) 05/05/2023    HDL 42 05/05/2023     (H) 05/05/2023    VLDL 30 05/05/2023            HEALTH RISK ASSESSMENT    Smoking Status:  Social History     Tobacco Use   Smoking Status Every Day   • Packs/day: 1.00   • Years: 30.00   • Pack years: 30.00   • Types: Cigarettes   Smokeless Tobacco Never     Alcohol Consumption:  Social " History     Substance and Sexual Activity   Alcohol Use Never     Fall Risk Screen:    CLARISSA Fall Risk Assessment has not been completed.    Depression Screenin/2/2023    10:12 AM   PHQ-2/PHQ-9 Depression Screening   Little Interest or Pleasure in Doing Things 0-->not at all   Feeling Down, Depressed or Hopeless 0-->not at all   PHQ-9: Brief Depression Severity Measure Score 0       Health Habits and Functional and Cognitive Screening:       View : No data to display.                Age-appropriate Screening Schedule:  Refer to the list below for future screening recommendations based on patient's age, sex and/or medical conditions. Orders for these recommended tests are listed in the plan section. The patient has been provided with a written plan.    Health Maintenance   Topic Date Due   • MAMMOGRAM  Never done   • LUNG CANCER SCREENING  Never done   • ZOSTER VACCINE (1 of 2) 2023 (Originally 1991)   • COVID-19 Vaccine (1) 2023 (Originally 1972)   • Hepatitis B (2 of 3 - 3-dose series) 2024 (Originally 2011)   • TDAP/TD VACCINES (1 - Tdap) 2024 (Originally 1991)   • HEPATITIS C SCREENING  2024 (Originally 2023)   • Pneumococcal Vaccine 0-64 (1 - PCV) 2024 (Originally 1978)   • INFLUENZA VACCINE  2023   • ANNUAL WELLNESS VISIT  05/10/2024   • COLORECTAL CANCER SCREENING  2026              Assessment & Plan   CMS Preventative Services Quick Reference  Risk Factors Identified During Encounter  reviewed  The above risks/problems have been discussed with the patient.  Follow up actions/plans if indicated are seen below in the Assessment/Plan Section.  Pertinent information has been shared with the patient in the After Visit Summary.    Diagnoses and all orders for this visit:    1. Encounter for subsequent annual wellness visit (AWV) in Medicare patient (Primary)    2. Acute cystitis without hematuria  -     Discontinue: levoFLOXacin  (Levaquin) 750 MG tablet; Take 1 tablet by mouth Daily.  Dispense: 7 tablet; Refill: 0    3. Asymptomatic HIV infection, with no history of HIV-related illness  -     Discontinue: levoFLOXacin (Levaquin) 750 MG tablet; Take 1 tablet by mouth Daily.  Dispense: 7 tablet; Refill: 0      Reviewed AWV recommendations and ordered as appropriate, follow up annually for review, sooner if concerns    No depression, falls, dementia symptoms or other  Risk and home safety assessment good    Followed up on chronic conditions and ordered refills, appropriate monitoring labs additionally as needed every 6 months or sooner if indicated, controlled stable    Follow up at least every 3-6 months for chronic conditions and as scheduled with appropriate specialists as indicated otherwise    *tx UTI as above, follow up if worse no better, increase fluids precautions given      Follow Up:   Return if symptoms worsen or fail to improve, for Next scheduled follow up, Recheck.     An After Visit Summary and PPPS were made available to the patient.

## 2023-05-12 ENCOUNTER — TELEPHONE (OUTPATIENT)
Dept: FAMILY MEDICINE CLINIC | Facility: CLINIC | Age: 53
End: 2023-05-12

## 2023-05-12 NOTE — TELEPHONE ENCOUNTER
Caller: Kyle Yuen    Relationship: Self    Best call back number: [phone]    What test was performed: BLOOD WORK     When was the test performed: 5-4-23      Additional notes: PATIENT WOULD LIKE A CALL BACK FOR RESULTS     PLEASE ADVISE

## 2023-05-13 NOTE — PROGRESS NOTES
Labs were good, check to make sure feeling better since changing antibiotic and let me know if not or other symptoms concerns.     *needs mammogram and lung cancer screening ordered per [facility] does she want us to order and schedule?

## 2023-05-15 ENCOUNTER — TELEPHONE (OUTPATIENT)
Dept: FAMILY MEDICINE CLINIC | Facility: CLINIC | Age: 53
End: 2023-05-15
Payer: MEDICARE

## 2023-05-15 DIAGNOSIS — Z12.31 ENCOUNTER FOR SCREENING MAMMOGRAM FOR MALIGNANT NEOPLASM OF BREAST: ICD-10-CM

## 2023-05-15 DIAGNOSIS — Z87.891 PERSONAL HISTORY OF NICOTINE DEPENDENCE: Primary | ICD-10-CM

## 2023-05-15 PROBLEM — L03.316 NAVEL CELLULITIS: Status: ACTIVE | Noted: 2023-05-02

## 2023-05-15 PROBLEM — L03.316 NAVEL CELLULITIS: Status: ACTIVE | Noted: 2023-05-15

## 2023-05-15 NOTE — ASSESSMENT & PLAN NOTE
Continue antivirals, follow up with infectious disease and can help manage care to prevent traveling long distances as able

## 2023-05-15 NOTE — ASSESSMENT & PLAN NOTE
Treatment with cleaning area with soap and water, trying to calm down any irritation cause issues or dislodge is lint or foreign body

## 2023-05-15 NOTE — ASSESSMENT & PLAN NOTE
Tobacco use is unchanged.  discussed cessation and screening lung cancer briefkly  Tobacco use will be reassessed at the next regular appointment   Consider lung cancer screen

## 2023-05-25 ENCOUNTER — TRANSCRIBE ORDERS (OUTPATIENT)
Dept: ADMINISTRATIVE | Facility: HOSPITAL | Age: 53
End: 2023-05-25

## 2023-05-25 DIAGNOSIS — Z12.39 SCREENING BREAST EXAMINATION: Primary | ICD-10-CM

## 2023-05-25 DIAGNOSIS — Z12.31 ENCOUNTER FOR SCREENING MAMMOGRAM FOR MALIGNANT NEOPLASM OF BREAST: ICD-10-CM

## 2023-06-16 ENCOUNTER — HOSPITAL ENCOUNTER (OUTPATIENT)
Dept: CT IMAGING | Facility: HOSPITAL | Age: 53
Discharge: HOME | End: 2023-06-16
Payer: MEDICARE

## 2023-06-16 DIAGNOSIS — Z87.891 PERSONAL HISTORY OF NICOTINE DEPENDENCE: ICD-10-CM

## 2023-06-16 PROCEDURE — 71271 CT THORAX LUNG CANCER SCR C-: CPT

## 2023-11-02 ENCOUNTER — HOSPITAL ENCOUNTER (OUTPATIENT)
Dept: GENERAL RADIOLOGY | Facility: HOSPITAL | Age: 53
Discharge: HOME | End: 2023-11-02
Admitting: FAMILY MEDICINE
Payer: MEDICARE

## 2023-11-02 ENCOUNTER — OFFICE VISIT (OUTPATIENT)
Dept: FAMILY MEDICINE CLINIC | Facility: CLINIC | Age: 53
End: 2023-11-02
Payer: MEDICARE

## 2023-11-02 VITALS
WEIGHT: 132.4 LBS | BODY MASS INDEX: 21.28 KG/M2 | DIASTOLIC BLOOD PRESSURE: 64 MMHG | RESPIRATION RATE: 16 BRPM | TEMPERATURE: 98.7 F | OXYGEN SATURATION: 98 % | HEART RATE: 80 BPM | SYSTOLIC BLOOD PRESSURE: 122 MMHG | HEIGHT: 66 IN

## 2023-11-02 DIAGNOSIS — R05.9 COUGH, UNSPECIFIED TYPE: ICD-10-CM

## 2023-11-02 DIAGNOSIS — N39.0 URINARY TRACT INFECTION WITHOUT HEMATURIA, SITE UNSPECIFIED: Primary | ICD-10-CM

## 2023-11-02 PROCEDURE — 1159F MED LIST DOCD IN RCRD: CPT | Performed by: FAMILY MEDICINE

## 2023-11-02 PROCEDURE — 1160F RVW MEDS BY RX/DR IN RCRD: CPT | Performed by: FAMILY MEDICINE

## 2023-11-02 PROCEDURE — 71046 X-RAY EXAM CHEST 2 VIEWS: CPT

## 2023-11-02 PROCEDURE — 99213 OFFICE O/P EST LOW 20 MIN: CPT | Performed by: FAMILY MEDICINE

## 2023-11-02 RX ORDER — PREDNISONE 20 MG/1
20 TABLET ORAL 2 TIMES DAILY
Qty: 14 TABLET | Refills: 0 | Status: SHIPPED | OUTPATIENT
Start: 2023-11-02

## 2023-11-02 RX ORDER — DOXYCYCLINE HYCLATE 100 MG/1
100 CAPSULE ORAL 2 TIMES DAILY
Qty: 14 CAPSULE | Refills: 0 | Status: SHIPPED | OUTPATIENT
Start: 2023-11-02

## 2023-11-02 NOTE — PROGRESS NOTES
"Chief Complaint  Follow-up and Urinary Tract Infection    Subjective        Kyle Yuen presents to [facility]  History of Present Illness    Presents with cough congestion has had for few weeks without significant improvement has HIV follows with infectious disease    Objective   Vital Signs:  /64   Pulse 80   Temp 98.7 °F (37.1 °C)   Resp 16   Ht 167.6 cm (66\")   Wt 60.1 kg (132 lb 6.4 oz)   SpO2 98%   BMI 21.37 kg/m²   Estimated body mass index is 21.37 kg/m² as calculated from the following:    Height as of this encounter: 167.6 cm (66\").    Weight as of this encounter: 60.1 kg (132 lb 6.4 oz).       BMI is within normal parameters. No other follow-up for BMI required.      Physical Exam  Vitals reviewed.   Constitutional:       Appearance: Normal appearance. She is well-developed.   HENT:      Head: Normocephalic and atraumatic.      Right Ear: External ear normal.      Left Ear: External ear normal.      Nose: Nose normal.   Eyes:      Conjunctiva/sclera: Conjunctivae normal.      Pupils: Pupils are equal, round, and reactive to light.   Cardiovascular:      Rate and Rhythm: Normal rate.   Pulmonary:      Effort: Pulmonary effort is normal.      Breath sounds: Normal breath sounds.   Abdominal:      General: There is no distension.   Skin:     General: Skin is warm and dry.   Neurological:      Mental Status: She is alert and oriented to person, place, and time. Mental status is at baseline.   Psychiatric:         Mood and Affect: Mood and affect normal.         Behavior: Behavior normal.         Thought Content: Thought content normal.         Judgment: Judgment normal.        Result Review :  The following data was reviewed by: Evan Bueno DO on 11/02/2023:  Common labs          5/5/2023    08:13   Common Labs   Glucose 95    BUN 10    Creatinine 0.91    Sodium 136    Potassium 4.1    Chloride 103    Calcium 9.3    Albumin 4.3    Total Bilirubin 0.2  "   Alkaline Phosphatase 49    AST (SGOT) 19    ALT (SGPT) 12    WBC 7.21    Hemoglobin 12.8    Hematocrit 38.0    Platelets 193    Total Cholesterol 200    Triglycerides 170    HDL Cholesterol 42    LDL Cholesterol  128                   Assessment and Plan   Diagnoses and all orders for this visit:    1. Urinary tract infection without hematuria, site unspecified (Primary)    2. Cough, unspecified type  -     XR Chest 2 View; Future    Other orders  -     predniSONE (DELTASONE) 20 MG tablet; Take 1 tablet by mouth 2 (Two) Times a Day.  Dispense: 14 tablet; Refill: 0  -     doxycycline (VIBRAMYCIN) 100 MG capsule; Take 1 capsule by mouth 2 (Two) Times a Day.  Dispense: 14 capsule; Refill: 0      UA today negative we will treat for bronchitis above x-ray additionally ordered to further evaluate breathing continue with infectious disease for HIV         Follow Up   Return in about 6 months (around 5/2/2024), or if symptoms worsen or fail to improve, for Next scheduled follow up, Recheck.  Patient was given instructions and counseling regarding her condition or for health maintenance advice. Please see specific information pulled into the AVS if appropriate.

## 2024-04-19 ENCOUNTER — TRANSCRIBE ORDERS (OUTPATIENT)
Dept: ADMINISTRATIVE | Facility: HOSPITAL | Age: 54
End: 2024-04-19
Payer: MEDICARE

## 2024-04-19 ENCOUNTER — HOSPITAL ENCOUNTER (OUTPATIENT)
Dept: GENERAL RADIOLOGY | Facility: HOSPITAL | Age: 54
Discharge: HOME | End: 2024-04-19
Payer: MEDICARE

## 2024-04-19 DIAGNOSIS — M79.645 FINGER PAIN, LEFT: Primary | ICD-10-CM

## 2024-04-19 DIAGNOSIS — M79.645 FINGER PAIN, LEFT: ICD-10-CM

## 2024-04-19 PROCEDURE — 73130 X-RAY EXAM OF HAND: CPT

## 2024-12-26 ENCOUNTER — HOSPITAL ENCOUNTER (EMERGENCY)
Facility: HOSPITAL | Age: 54
Discharge: HOME | End: 2024-12-26
Attending: EMERGENCY MEDICINE
Payer: MEDICAID

## 2024-12-26 VITALS
RESPIRATION RATE: 16 BRPM | SYSTOLIC BLOOD PRESSURE: 134 MMHG | HEART RATE: 87 BPM | DIASTOLIC BLOOD PRESSURE: 81 MMHG | TEMPERATURE: 97.9 F | OXYGEN SATURATION: 97 %

## 2024-12-26 DIAGNOSIS — J06.9 VIRAL URI WITH COUGH: Primary | ICD-10-CM

## 2024-12-26 LAB
FLUAV SUBTYP SPEC NAA+PROBE: NOT DETECTED
FLUBV RNA ISLT QL NAA+PROBE: NOT DETECTED
RSV RNA NPH QL NAA+NON-PROBE: NOT DETECTED
S PYO AG THROAT QL: NEGATIVE
SARS-COV-2 RNA RESP QL NAA+PROBE: NOT DETECTED

## 2024-12-26 PROCEDURE — 87637 SARSCOV2&INF A&B&RSV AMP PRB: CPT | Performed by: EMERGENCY MEDICINE

## 2024-12-26 PROCEDURE — 99283 EMERGENCY DEPT VISIT LOW MDM: CPT

## 2024-12-26 PROCEDURE — 87880 STREP A ASSAY W/OPTIC: CPT | Performed by: EMERGENCY MEDICINE

## 2024-12-26 PROCEDURE — 87081 CULTURE SCREEN ONLY: CPT | Performed by: EMERGENCY MEDICINE

## 2024-12-26 RX ORDER — METHYLPREDNISOLONE 4 MG/1
TABLET ORAL
Qty: 21 TABLET | Refills: 0 | Status: SHIPPED | OUTPATIENT
Start: 2024-12-26

## 2024-12-26 NOTE — ED PROVIDER NOTES
Time: 6:59 PM EST  Date of encounter:  12/26/2024  Independent Historian/Clinical History and Information was obtained by:   Patient    History is limited by: N/A    Chief Complaint: cough       History of Present Illness:  Patient is a 54 y.o. year old female who presents to the emergency department for evaluation of dry cough, sore throat, body aches that began 3 days ago.  Patient denies chest pain shortness of breath.  Patient denies known recent sick contact.      Patient Care Team  Primary Care Provider: Evan Bueno DO    Past Medical History:     Allergies   Allergen Reactions    Sulfamethoxazole-Trimethoprim Hives    Sulfa Antibiotics Rash     Past Medical History:   Diagnosis Date    Allergic     HIV (human immunodeficiency virus infection)      Past Surgical History:   Procedure Laterality Date    APPENDECTOMY      HYSTERECTOMY       Family History   Problem Relation Age of Onset    Hypertension Mother     Diabetes Mother     Heart attack Mother     Hypertension Father     Diabetes Father     Stroke Father     Heart attack Father     Seizures Sister        Home Medications:  Prior to Admission medications    Medication Sig Start Date End Date Taking? Authorizing Provider   Bictegravir-Emtricitab-Tenofov (Biktarvy) -25 MG per tablet Take  by mouth Daily.    Provider, MD Nida   doxycycline (VIBRAMYCIN) 100 MG capsule Take 1 capsule by mouth 2 (Two) Times a Day. 11/2/23   Evan Bueno DO   predniSONE (DELTASONE) 20 MG tablet Take 1 tablet by mouth 2 (Two) Times a Day. 11/2/23   Evan Bueno DO        Social History:   Social History     Tobacco Use    Smoking status: Every Day     Current packs/day: 1.00     Average packs/day: 1 pack/day for 30.0 years (30.0 ttl pk-yrs)     Types: Cigarettes    Smokeless tobacco: Never   Vaping Use    Vaping status: Never Used   Substance Use Topics    Alcohol use: Never    Drug use: Never         Review of Systems:  Review of Systems   Constitutional:   Negative for chills and fever.   HENT:  Positive for sore throat. Negative for congestion and rhinorrhea.    Eyes:  Negative for pain and visual disturbance.   Respiratory:  Positive for cough. Negative for apnea, chest tightness and shortness of breath.    Cardiovascular:  Negative for chest pain and palpitations.   Gastrointestinal:  Negative for abdominal pain, diarrhea, nausea and vomiting.   Genitourinary:  Negative for difficulty urinating and dysuria.   Musculoskeletal:  Positive for myalgias. Negative for joint swelling.   Skin:  Negative for color change.   Neurological:  Negative for seizures and headaches.   Psychiatric/Behavioral: Negative.     All other systems reviewed and are negative.       Physical Exam:  /81 (BP Location: Right arm, Patient Position: Sitting)   Pulse 87   Temp 97.9 °F (36.6 °C) (Oral)   Resp 16   SpO2 97%     Physical Exam  Vitals and nursing note reviewed.   Constitutional:       General: She is not in acute distress.     Appearance: Normal appearance. She is not toxic-appearing.   HENT:      Head: Normocephalic and atraumatic.      Jaw: There is normal jaw occlusion.      Mouth/Throat:      Pharynx: Uvula midline. No oropharyngeal exudate or posterior oropharyngeal erythema.      Tonsils: No tonsillar exudate.   Eyes:      General: Lids are normal.      Extraocular Movements: Extraocular movements intact.      Conjunctiva/sclera: Conjunctivae normal.      Pupils: Pupils are equal, round, and reactive to light.   Cardiovascular:      Rate and Rhythm: Normal rate and regular rhythm.      Pulses: Normal pulses.      Heart sounds: Normal heart sounds.   Pulmonary:      Effort: Pulmonary effort is normal. No respiratory distress.      Breath sounds: Normal breath sounds. No wheezing or rhonchi.   Abdominal:      General: Abdomen is flat.      Palpations: Abdomen is soft.      Tenderness: There is no abdominal tenderness. There is no guarding or rebound.   Musculoskeletal:          General: Normal range of motion.      Cervical back: Normal range of motion and neck supple.      Right lower leg: No edema.      Left lower leg: No edema.   Skin:     General: Skin is warm and dry.   Neurological:      Mental Status: She is alert and oriented to person, place, and time. Mental status is at baseline.   Psychiatric:         Mood and Affect: Mood normal.                    Medical Decision Making:      Comorbidities that affect care:    HIV    External Notes reviewed:          The following orders were placed and all results were independently analyzed by me:  Orders Placed This Encounter   Procedures    COVID-19, FLU A/B, RSV PCR 1 HR TAT - Swab, Nasopharynx    Rapid Strep A Screen - Swab, Throat    Beta Strep Culture, Throat - Swab, Throat       Medications Given in the Emergency Department:  Medications - No data to display     ED Course:         Labs:    Lab Results (last 24 hours)       Procedure Component Value Units Date/Time    COVID-19, FLU A/B, RSV PCR 1 HR TAT - Swab, Nasopharynx [[id number]]  (Normal) Collected: 12/26/24 1749    Specimen: Swab from Nasopharynx Updated: 12/26/24 1830     COVID19 Not Detected     Influenza A PCR Not Detected     Influenza B PCR Not Detected     RSV, PCR Not Detected    Narrative:      Fact sheet for providers: https://www.fda.gov/media/505767/download    Fact sheet for patients: https://www.fda.gov/media/395436/download    Test performed by PCR.    Rapid Strep A Screen - Swab, Throat [[id number]]  (Normal) Collected: 12/26/24 1749    Specimen: Swab from Throat Updated: 12/26/24 1804     Strep A Ag Negative    Beta Strep Culture, Throat - Swab, Throat [[id number]] Collected: 12/26/24 1749    Specimen: Swab from Throat Updated: 12/26/24 1804             Imaging:    No Radiology Exams Resulted Within Past 24 Hours      Differential Diagnosis and Discussion:    Cough: Differential diagnosis includes but is not limited to pneumonia, acute bronchitis, upper  respiratory infection, ACE inhibitor use, allergic reaction, epiglottitis, seasonal allergies, chemical irritants, exercise-induced asthma, viral syndrome.    PROCEDURES:    Labs were collected in the emergency department and all labs were reviewed and interpreted by me.    No orders to display       Procedures    MDM       Show exam influenza, RSV, strep negative.  Oxygen saturation 97% on room air.  Patient is afebrile.  Patient is resting comfortably this time.  Patient is a viral URI.  I will send patient with Medrol Dosepak.  I instructed patient to return to ED if she develops any new or worsening symptoms.  Patient states she understands and agrees with plan of care.              Patient Care Considerations:          Consultants/Shared Management Plan:    None    Social Determinants of Health:    Patient is independent, reliable, and has access to care.       Disposition and Care Coordination:    Discharged: The patient is suitable and stable for discharge with no need for consideration of admission.    I have explained the patient´s condition, diagnoses and treatment plan based on the information available to me at this time. I have answered questions and addressed any concerns. The patient has a good  understanding of the patient´s diagnosis, condition, and treatment plan as can be expected at this point. The vital signs have been stable. The patient´s condition is stable and appropriate for discharge from the emergency department.      The patient will pursue further outpatient evaluation with the primary care physician or other designated or consulting physician as outlined in the discharge instructions. They are agreeable to this plan of care and follow-up instructions have been explained in detail. The patient has received these instructions in written format and has expressed an understanding of the discharge instructions. The patient is aware that any significant change in condition or worsening of  symptoms should prompt an immediate return to this or the closest emergency department or call to 911.  I have explained discharge medications and the need for follow up with the patient/caretakers. This was also printed in the discharge instructions. Patient was discharged with the following medications and follow up:      Medication List        New Prescriptions      methylPREDNISolone 4 MG dose pack  Commonly known as: MEDROL  Take as directed on package instructions.               Where to Get Your Medications        These medications were sent to [facility][id number] - [place], [place] - [facility] [address] [facility] AT [facility] [phone] [facility] [phone]   [address] [address]      Phone: [phone]   methylPREDNISolone 4 MG dose pack      Evan Bueno  [address]  [address] [address] [phone]    Call in 1 day  To schedule follow-up       Final diagnoses:   Viral URI with cough        ED Disposition       ED Disposition   Discharge    Condition   Stable    Comment   --               This medical record created using voice recognition software.             Frankie Francisco PA-C  12/26/24 9031

## 2024-12-26 NOTE — Clinical Note
[facility] EMERGENCY ROOM  [address] [place] [address]  Phone: [phone]  Fax: [phone]    Kyle Yuen was seen and treated in our emergency department on 12/26/2024.  She may return to work on 12/27/2024.         Thank you for choosing [facility].    Frankie Francisco PA-C       Chart(s)/Patient

## 2024-12-29 LAB — BACTERIA SPEC AEROBE CULT: NORMAL
